# Patient Record
Sex: MALE | URBAN - METROPOLITAN AREA
[De-identification: names, ages, dates, MRNs, and addresses within clinical notes are randomized per-mention and may not be internally consistent; named-entity substitution may affect disease eponyms.]

---

## 2017-04-08 ENCOUNTER — EMERGENCY (EMERGENCY)
Facility: HOSPITAL | Age: 47
LOS: 1 days | Discharge: ANOTHER HOSPITAL | End: 2017-04-08
Admitting: EMERGENCY MEDICINE
Payer: SELF-PAY

## 2017-04-08 VITALS
HEART RATE: 71 BPM | OXYGEN SATURATION: 93 % | DIASTOLIC BLOOD PRESSURE: 93 MMHG | RESPIRATION RATE: 18 BRPM | SYSTOLIC BLOOD PRESSURE: 137 MMHG | TEMPERATURE: 99 F

## 2017-04-08 DIAGNOSIS — F23 BRIEF PSYCHOTIC DISORDER: ICD-10-CM

## 2017-04-08 LAB
ALBUMIN SERPL ELPH-MCNC: 4.2 G/DL — SIGNIFICANT CHANGE UP (ref 3.3–5)
ALP SERPL-CCNC: 79 U/L — SIGNIFICANT CHANGE UP (ref 40–120)
ALT FLD-CCNC: 15 U/L — SIGNIFICANT CHANGE UP (ref 4–41)
AMPHET UR-MCNC: POSITIVE — SIGNIFICANT CHANGE UP
APAP SERPL-MCNC: < 15 UG/ML — LOW (ref 15–25)
APPEARANCE UR: SIGNIFICANT CHANGE UP
AST SERPL-CCNC: 45 U/L — HIGH (ref 4–40)
BACTERIA # UR AUTO: SIGNIFICANT CHANGE UP
BARBITURATES MEASUREMENT: NEGATIVE — SIGNIFICANT CHANGE UP
BARBITURATES UR SCN-MCNC: NEGATIVE — SIGNIFICANT CHANGE UP
BASOPHILS # BLD AUTO: 0.02 K/UL — SIGNIFICANT CHANGE UP (ref 0–0.2)
BASOPHILS NFR BLD AUTO: 0.1 % — SIGNIFICANT CHANGE UP (ref 0–2)
BENZODIAZ SERPL-MCNC: NEGATIVE — SIGNIFICANT CHANGE UP
BENZODIAZ UR-MCNC: NEGATIVE — SIGNIFICANT CHANGE UP
BILIRUB SERPL-MCNC: 0.6 MG/DL — SIGNIFICANT CHANGE UP (ref 0.2–1.2)
BILIRUB UR-MCNC: NEGATIVE — SIGNIFICANT CHANGE UP
BLOOD UR QL VISUAL: HIGH
BUN SERPL-MCNC: 18 MG/DL — SIGNIFICANT CHANGE UP (ref 7–23)
CALCIUM SERPL-MCNC: 9.9 MG/DL — SIGNIFICANT CHANGE UP (ref 8.4–10.5)
CANNABINOIDS UR-MCNC: NEGATIVE — SIGNIFICANT CHANGE UP
CHLORIDE SERPL-SCNC: 102 MMOL/L — SIGNIFICANT CHANGE UP (ref 98–107)
CO2 SERPL-SCNC: 25 MMOL/L — SIGNIFICANT CHANGE UP (ref 22–31)
COCAINE METAB.OTHER UR-MCNC: POSITIVE — SIGNIFICANT CHANGE UP
COLOR SPEC: YELLOW — SIGNIFICANT CHANGE UP
CREAT SERPL-MCNC: 1.21 MG/DL — SIGNIFICANT CHANGE UP (ref 0.5–1.3)
EOSINOPHIL # BLD AUTO: 0 K/UL — SIGNIFICANT CHANGE UP (ref 0–0.5)
EOSINOPHIL NFR BLD AUTO: 0 % — SIGNIFICANT CHANGE UP (ref 0–6)
ETHANOL BLD-MCNC: < 10 MG/DL — SIGNIFICANT CHANGE UP
GLUCOSE SERPL-MCNC: 102 MG/DL — HIGH (ref 70–99)
GLUCOSE UR-MCNC: NEGATIVE — SIGNIFICANT CHANGE UP
HCT VFR BLD CALC: 36.2 % — LOW (ref 39–50)
HGB BLD-MCNC: 12.5 G/DL — LOW (ref 13–17)
HYALINE CASTS # UR AUTO: SIGNIFICANT CHANGE UP (ref 0–?)
IMM GRANULOCYTES NFR BLD AUTO: 0.2 % — SIGNIFICANT CHANGE UP (ref 0–1.5)
KETONES UR-MCNC: SIGNIFICANT CHANGE UP
LEUKOCYTE ESTERASE UR-ACNC: NEGATIVE — SIGNIFICANT CHANGE UP
LYMPHOCYTES # BLD AUTO: 1.69 K/UL — SIGNIFICANT CHANGE UP (ref 1–3.3)
LYMPHOCYTES # BLD AUTO: 11 % — LOW (ref 13–44)
MCHC RBC-ENTMCNC: 30.1 PG — SIGNIFICANT CHANGE UP (ref 27–34)
MCHC RBC-ENTMCNC: 34.5 % — SIGNIFICANT CHANGE UP (ref 32–36)
MCV RBC AUTO: 87.2 FL — SIGNIFICANT CHANGE UP (ref 80–100)
METHADONE UR-MCNC: NEGATIVE — SIGNIFICANT CHANGE UP
MONOCYTES # BLD AUTO: 0.71 K/UL — SIGNIFICANT CHANGE UP (ref 0–0.9)
MONOCYTES NFR BLD AUTO: 4.6 % — SIGNIFICANT CHANGE UP (ref 2–14)
MUCOUS THREADS # UR AUTO: SIGNIFICANT CHANGE UP
NEUTROPHILS # BLD AUTO: 12.92 K/UL — HIGH (ref 1.8–7.4)
NEUTROPHILS NFR BLD AUTO: 84.1 % — HIGH (ref 43–77)
NITRITE UR-MCNC: NEGATIVE — SIGNIFICANT CHANGE UP
OPIATES UR-MCNC: NEGATIVE — SIGNIFICANT CHANGE UP
OXYCODONE UR-MCNC: NEGATIVE — SIGNIFICANT CHANGE UP
PCP UR-MCNC: NEGATIVE — SIGNIFICANT CHANGE UP
PH UR: 6 — SIGNIFICANT CHANGE UP (ref 4.6–8)
PLATELET # BLD AUTO: 263 K/UL — SIGNIFICANT CHANGE UP (ref 150–400)
PMV BLD: 9.7 FL — SIGNIFICANT CHANGE UP (ref 7–13)
POTASSIUM SERPL-MCNC: 3 MMOL/L — LOW (ref 3.5–5.3)
POTASSIUM SERPL-SCNC: 3 MMOL/L — LOW (ref 3.5–5.3)
PROT SERPL-MCNC: 6.7 G/DL — SIGNIFICANT CHANGE UP (ref 6–8.3)
PROT UR-MCNC: 100 — SIGNIFICANT CHANGE UP
RBC # BLD: 4.15 M/UL — LOW (ref 4.2–5.8)
RBC # FLD: 12.8 % — SIGNIFICANT CHANGE UP (ref 10.3–14.5)
RBC CASTS # UR COMP ASSIST: HIGH (ref 0–?)
SALICYLATES SERPL-MCNC: < 5 MG/DL — LOW (ref 15–30)
SODIUM SERPL-SCNC: 144 MMOL/L — SIGNIFICANT CHANGE UP (ref 135–145)
SP GR SPEC: 1.03 — SIGNIFICANT CHANGE UP (ref 1–1.03)
TSH SERPL-MCNC: 1.33 UIU/ML — SIGNIFICANT CHANGE UP (ref 0.27–4.2)
UROBILINOGEN FLD QL: 1 E.U. — SIGNIFICANT CHANGE UP (ref 0.1–0.2)
WBC # BLD: 15.37 K/UL — HIGH (ref 3.8–10.5)
WBC # FLD AUTO: 15.37 K/UL — HIGH (ref 3.8–10.5)
WBC UR QL: SIGNIFICANT CHANGE UP (ref 0–?)

## 2017-04-08 PROCEDURE — 70450 CT HEAD/BRAIN W/O DYE: CPT | Mod: 26

## 2017-04-08 PROCEDURE — 93010 ELECTROCARDIOGRAM REPORT: CPT

## 2017-04-08 PROCEDURE — 99285 EMERGENCY DEPT VISIT HI MDM: CPT | Mod: GC

## 2017-04-08 PROCEDURE — 99285 EMERGENCY DEPT VISIT HI MDM: CPT | Mod: 25

## 2017-04-08 RX ORDER — DIPHENHYDRAMINE HCL 50 MG
50 CAPSULE ORAL ONCE
Qty: 0 | Refills: 0 | Status: COMPLETED | OUTPATIENT
Start: 2017-04-08 | End: 2017-04-08

## 2017-04-08 RX ORDER — HALOPERIDOL DECANOATE 100 MG/ML
5 INJECTION INTRAMUSCULAR ONCE
Qty: 0 | Refills: 0 | Status: COMPLETED | OUTPATIENT
Start: 2017-04-08 | End: 2017-04-08

## 2017-04-08 RX ORDER — POTASSIUM CHLORIDE 20 MEQ
40 PACKET (EA) ORAL ONCE
Qty: 0 | Refills: 0 | Status: COMPLETED | OUTPATIENT
Start: 2017-04-08 | End: 2017-04-08

## 2017-04-08 RX ADMIN — Medication 50 MILLIGRAM(S): at 21:00

## 2017-04-08 RX ADMIN — HALOPERIDOL DECANOATE 5 MILLIGRAM(S): 100 INJECTION INTRAMUSCULAR at 21:00

## 2017-04-08 RX ADMIN — Medication 2 MILLIGRAM(S): at 21:00

## 2017-04-08 NOTE — ED BEHAVIORAL HEALTH ASSESSMENT NOTE - MEDICAL ISSUES AND PLAN (INCLUDE STANDING AND PRN MEDICATION)
Will replete K w 40 mEq tab once in the am Recommend kristal replete K w 40 mEq tab once in the am on the inpatient unit no acute indication for replation per medical ED.

## 2017-04-08 NOTE — ED BEHAVIORAL HEALTH NOTE - BEHAVIORAL HEALTH NOTE
Patient's insurance information was unavailable thus we could not determine if required or obtain prior authorization.

## 2017-04-08 NOTE — ED BEHAVIORAL HEALTH ASSESSMENT NOTE - DESCRIPTION
Upon presentation pt was very agitated and uncooperative, arguing that we would steal the 7000+$ that he brought w him, pt would not speak if security was in the room, and would make writer touch the matress bcs on multiple occasions yet not wanting to say what for. 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

## 2017-04-08 NOTE — ED PROVIDER NOTE - OBJECTIVE STATEMENT
47 y/o M , no significant medical/ Psychiatric hx BIBA  w c/o  delusions ' someone has stolen y identity".  States that his name on his documents were not accurate, so he called the police. Patient appears grossly paranoid and agitated. Medicated for safety and agitation.

## 2017-04-08 NOTE — ED BEHAVIORAL HEALTH ASSESSMENT NOTE - PSYCHIATRIC ISSUES AND PLAN (INCLUDE STANDING AND PRN MEDICATION)
Will start risperidone 1 mg po qhs, agitation PRN ativan 2 mg po/im q6h, benadryl 50 mg po/im q6h, haldol 5 mg po/im q6h

## 2017-04-08 NOTE — ED BEHAVIORAL HEALTH ASSESSMENT NOTE - HPI (INCLUDE ILLNESS QUALITY, SEVERITY, DURATION, TIMING, CONTEXT, MODIFYING FACTORS, ASSOCIATED SIGNS AND SYMPTOMS)
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     Pt states that he has been noticing for the past couple weeks some circumstances that just recently made him think that 2 girls he was dating are plotting against him. He broke up with the first one a couple months ago, who which he had been going out for 7 years. He says that he then meet another girl that he was dating, and that this girl started to get suspicious that he was trying to hack her phone. He then let her use his own phone, and she used it for about a week. In the meantime, his previous girlfriend re-contacted him after some time off. He remembers that he did buy her her phone, and started suspecting that she might also be accusing him of hacking her phone as well. He was surprised that the 2 girls became friends, as he would expect that they did not like each other. He later started thinking that actually when the second one took his phone, what she was doing was to hack it. Then he noticed that there was some movement in his bank account that was inaccurate, and that he thinks that was done by them. He also noticed when he was reviewing his paperwork of the license, medical insurance and social security, that someone had been going through these papers, and that some of these papers had his name mis-spelled (Pale, instead of Pile in his last name), what made him think that the 2 girls plotted against him to steal his identity. He says that last night they had hacked his car, and that he is not using it anymore for that reason and they took it from him. He urged to go to a bank in order to withdraw any money that he had, because he is concerned that they may take it and leave him w nothing. Of note the patient has been not going to work for the last couple weeks, because he was preoccupied by this, has been isolated, and not going to work, having received a letter about his unjustified absence for 2 weeks. Pt states that he uses cannabis on a regular basis, but denies having changed the provider or the quantity, and denies using any other type of drugs. Pt denies hallucinations along w this event. Pt denies mood sx, including depression, anhedonia, euphoria or irritability. Pt denies any of these sx prior to the onset 2 weeks ago.     Collateral from girlfriend of 7 years, she says that they recently broke up because he was "stressed out", more irritable and preoccupied than usual, about her family and their relationship. She had not seen him in a while, but recently he went to visit him, and he found out that he was w this other girl. She says that he got very agitated at the moment, and that he started accusing her of stealing his identity, his phone, and his car. She says that they got into an argument about that and that she left him at home. She says that this behavior is completely unusual for him, and denies having had any evidence of psychotic sx over the time she has known him (10 years total, including 7 dating). She denies hx of SA, denies recent changes in cannabis use, and has no evidence that he might have done other types of drugs. 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     Pt states that he has been noticing for the past couple weeks some circumstances that just recently made him think that 2 girls he was dating are plotting against him. He broke up with the first one a couple months ago, who which he had been going out for 7 years. He says that he then meet another girl that he was dating, and that this girl started to get suspicious that he was trying to hack her phone. He then let her use his own phone, and she used it for about a week. In the meantime, his previous girlfriend re-contacted him after some time off. He remembers that he did buy her her phone, and started suspecting that she might also be accusing him of hacking her phone as well. He was surprised that the 2 girls became friends, as he would expect that they did not like each other. He later started thinking that actually when the second one took his phone, what she was doing was to hack it. Then he noticed that there was some movement in his bank account that was inaccurate, and that he thinks that was done by them. He also noticed when he was reviewing his paperwork of the license, medical insurance and social security, that someone had been going through these papers, and that some of these papers had his name mis-spelled (Pale, instead of Pile in his last name), what made him think that the 2 girls plotted against him to steal his identity. He says that last night they had hacked his car, and that he is not using it anymore for that reason and they took it from him. He urged to go to a bank in order to withdraw any money that he had, because he is concerned that they may take it and leave him w nothing. Of note the patient has been not going to work for the last couple weeks, because he was preoccupied by this, has been isolated, and not going to work, having received a letter about his unjustified absence for 2 weeks. Pt states that he uses cannabis on a regular basis, but denies having changed the provider or the quantity, and denies using any other type of drugs. Pt denies hallucinations along w this event. Pt denies mood sx, including depression, anhedonia, euphoria or irritability. Pt denies any of these sx prior to the onset 2 weeks ago.     Collateral from girlfriend of 7 years, she says that they recently broke up because he was "stressed out", more irritable and preoccupied than usual, about her family and their relationship. She had not seen him in a while, but recently he went to visit him, and he found out that he was with this other girl. She says that he got very agitated at the moment, and that he started accusing her of stealing his identity, his phone, and his car. She says that they got into an argument about that and that she left him at home. She says that this behavior is completely unusual for him, and denies having had any evidence of psychotic sx over the time she has known him (10 years total, including 7 dating). She denies hx of SA, denies recent changes in cannabis use, and has no evidence that he might have done other types of drugs.

## 2017-04-08 NOTE — ED ADULT TRIAGE NOTE - CHIEF COMPLAINT QUOTE
Pt states he feels like people are trying to blow up his car and that he is being electrocuted at his house.  Denies SI/HI, calm and cooperative in triage.

## 2017-04-08 NOTE — ED ADULT NURSE REASSESSMENT NOTE - NS ED NURSE REASSESS COMMENT FT1
23:25-Patient currently in BH room 3, head CT complete, pt currently awaiting further MD evaluation, will continue to monitor pt.

## 2017-04-08 NOTE — ED ADULT NURSE NOTE - OBJECTIVE STATEMENT
Pt arrived to  unit at 2015 A&Ox3, pt extremely agitated , and uncooperative, pt acting  extremely paranoid upon arrival to , Pt sedated due to agitation escalating as per MD orders. Pt breathing spontaneously and unlabored, no injuries noted on patient , after sedation patient became calm and cooperated. v/s as charted, will continue to monitor. Pt arrived to  unit at 2015 A&Ox3, pt extremely agitated , and uncooperative, pt acting  extremely paranoid upon arrival to , Pt sedated due to agitation escalating as per MD orders. Pt breathing spontaneously and unlabored, no injuries noted on patient , after sedation patient became calm and cooperated. v/s as charted, will continue to monitor.  Note: Pt arrived with 7,000 in his pocket, all belongings and money was logged and accounted for with security.  Money was secured and logged in with security office.

## 2017-04-08 NOTE — ED PROVIDER NOTE - MEDICAL DECISION MAKING DETAILS
45 y/o M , no significant medical/ Psychiatric hx, behaviour likely due to polysubstance abuse.  CT head, EKG, labs, Urine/Tox, UA. Hydration encouraged. No evidence of physical injuries, broken skin or deformities.   Psychiatric consultations and dispo as per psych. 45 y/o M , no significant medical/ Psychiatric hx, behaviour likely due to polysubstance abuse.  CT head, EKG, labs, Urine/Tox, UA. Hydration encouraged. No evidence of physical injuries, broken skin or deformities.   Psychiatric consultations and dispo as per psych.. 45 y/o M , no significant medical/ Psychiatric hx, behaviour likely due to polysubstance abuse.  CT head, EKG, labs, Urine/Tox, UA. Hydration encouraged. No evidence of physical injuries, broken skin or deformities.   Psychiatric consultations and dispo as per psych.. Recommend 40 mEq potasium via mouth

## 2017-04-08 NOTE — ED BEHAVIORAL HEALTH ASSESSMENT NOTE - SUMMARY
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 Pt presents w paranoid and referential delusions for 2 weeks in the absence of previous PH, on which he is behaving (i.e., showed up to ED w all his savings concerned that they may be stolen by whoever stole his identity). At his age, organic cause should specially be ruled out for first break, however no evident cause (chronic cannabis use unchanged). 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 Pt presents w paranoid and referential delusions for 2 weeks in the absence of previous PH, on which he is behaving (i.e., showed up to ED w all his savings concerned that they may be stolen by whoever stole his identity, and was agitated reporting that strangers were going to steal his money away). 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 Pt presents w paranoid and referential delusions for 2 weeks in the absence of previous PH, on which he is behaving (i.e., showed up to ED w all his savings concerned that they may be stolen by whoever stole his identity, and was agitated reporting that strangers were going to steal his money away). Medical workup for psychosis was conducted, resulted in clear CT with Utox + for stimulants and cocaine. Given the current presentation of 2+ weeks of delusions resulting in behavioral changes without any support in the community makes of his discharge unsafe - an imminent danger to himself, but also to others.

## 2017-04-08 NOTE — ED BEHAVIORAL HEALTH ASSESSMENT NOTE - RISK ASSESSMENT
risk factors include acute psychosis, substance use, behaving on his psychotic sx, social isolation, at risk of losing employment. Mitigated by lack of current SI/HI, no PPH, no legal hx, and hospitalization.

## 2017-04-08 NOTE — ED BEHAVIORAL HEALTH ASSESSMENT NOTE - CASE SUMMARY
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 Pt presents w paranoid and referential delusions for 2 weeks in the absence of previous PH, on which he is behaving (i.e., showed up to ED w all his savings concerned that they may be stolen by whoever stole his identity, and was agitated reporting that strangers were going to steal his money away). Medical workup for psychosis was conducted, resulted in clear CT with Utox + for stimulants and cocaine. Patient has an elaborate delusion, extremely paranoid, became agitated when challenged requiring medication over objection, he is not functioning due to the psychosis not going to work and acting on his delusions removing all of his savings from the bank, he is not caring for himself and given his agitation in response to challenging the delusions poses a danger to self and others with no insight, requires involuntary inpatient psychiatric hospitalization for safety and stabilization.  He has elaborate delusions over several weeks not consistent with substance induced psychosis though it may have been precipitated by amphetamine.cocain abuse. We have no beds at [facility] thus we will transfer to [facility] on 9.27 status and start risperidone for psychosis.  K+ 3.0, medical ED not concerned for acute problems but recommended oral repletion in the am on the unit 40 meq.

## 2017-04-09 ENCOUNTER — INPATIENT (INPATIENT)
Facility: HOSPITAL | Age: 47
LOS: 10 days | Discharge: HOME | DRG: 897 | End: 2017-04-20
Attending: PSYCHIATRY & NEUROLOGY | Admitting: PSYCHIATRY & NEUROLOGY
Payer: COMMERCIAL

## 2017-04-09 VITALS
HEART RATE: 70 BPM | RESPIRATION RATE: 14 BRPM | TEMPERATURE: 99 F | OXYGEN SATURATION: 100 % | DIASTOLIC BLOOD PRESSURE: 70 MMHG | SYSTOLIC BLOOD PRESSURE: 105 MMHG

## 2017-04-09 VITALS
HEART RATE: 101 BPM | TEMPERATURE: 98 F | SYSTOLIC BLOOD PRESSURE: 158 MMHG | HEIGHT: 69 IN | WEIGHT: 132.06 LBS | RESPIRATION RATE: 19 BRPM | DIASTOLIC BLOOD PRESSURE: 99 MMHG

## 2017-04-09 PROCEDURE — 99231 SBSQ HOSP IP/OBS SF/LOW 25: CPT

## 2017-04-09 RX ORDER — HALOPERIDOL DECANOATE 100 MG/ML
5 INJECTION INTRAMUSCULAR EVERY 6 HOURS
Qty: 0 | Refills: 0 | Status: DISCONTINUED | OUTPATIENT
Start: 2017-04-09 | End: 2017-04-20

## 2017-04-09 RX ORDER — RISPERIDONE 4 MG/1
1 TABLET ORAL AT BEDTIME
Qty: 0 | Refills: 0 | Status: DISCONTINUED | OUTPATIENT
Start: 2017-04-09 | End: 2017-04-12

## 2017-04-09 RX ADMIN — Medication 40 MILLIEQUIVALENT(S): at 02:48

## 2017-04-10 PROCEDURE — 99223 1ST HOSP IP/OBS HIGH 75: CPT

## 2017-04-10 RX ORDER — DIPHENHYDRAMINE HCL 50 MG
50 CAPSULE ORAL EVERY 6 HOURS
Qty: 0 | Refills: 0 | Status: DISCONTINUED | OUTPATIENT
Start: 2017-04-10 | End: 2017-04-20

## 2017-04-11 PROCEDURE — 99232 SBSQ HOSP IP/OBS MODERATE 35: CPT

## 2017-04-12 LAB — PCP SPEC-MCNC: SIGNIFICANT CHANGE UP

## 2017-04-12 PROCEDURE — 99232 SBSQ HOSP IP/OBS MODERATE 35: CPT

## 2017-04-12 RX ORDER — RISPERIDONE 4 MG/1
1 TABLET ORAL
Qty: 0 | Refills: 0 | Status: DISCONTINUED | OUTPATIENT
Start: 2017-04-12 | End: 2017-04-17

## 2017-04-12 RX ORDER — RISPERIDONE 4 MG/1
1 TABLET ORAL ONCE
Qty: 0 | Refills: 0 | Status: COMPLETED | OUTPATIENT
Start: 2017-04-12 | End: 2017-04-12

## 2017-04-13 PROCEDURE — 99232 SBSQ HOSP IP/OBS MODERATE 35: CPT

## 2017-04-14 PROCEDURE — 99232 SBSQ HOSP IP/OBS MODERATE 35: CPT

## 2017-04-14 RX ORDER — RISPERIDONE 4 MG/1
0.5 TABLET ORAL ONCE
Qty: 0 | Refills: 0 | Status: COMPLETED | OUTPATIENT
Start: 2017-04-14 | End: 2017-04-14

## 2017-04-14 RX ADMIN — RISPERIDONE 0.5 MILLIGRAM(S): 4 TABLET ORAL at 13:14

## 2017-04-15 RX ADMIN — RISPERIDONE 1 MILLIGRAM(S): 4 TABLET ORAL at 09:47

## 2017-04-15 RX ADMIN — RISPERIDONE 1 MILLIGRAM(S): 4 TABLET ORAL at 22:21

## 2017-04-16 RX ADMIN — RISPERIDONE 1 MILLIGRAM(S): 4 TABLET ORAL at 09:59

## 2017-04-16 RX ADMIN — RISPERIDONE 1 MILLIGRAM(S): 4 TABLET ORAL at 22:38

## 2017-04-17 PROCEDURE — 99232 SBSQ HOSP IP/OBS MODERATE 35: CPT

## 2017-04-17 RX ORDER — ACETAMINOPHEN 500 MG
650 TABLET ORAL EVERY 6 HOURS
Qty: 0 | Refills: 0 | Status: DISCONTINUED | OUTPATIENT
Start: 2017-04-17 | End: 2017-04-20

## 2017-04-17 RX ORDER — RISPERIDONE 4 MG/1
1 TABLET ORAL DAILY
Qty: 0 | Refills: 0 | Status: DISCONTINUED | OUTPATIENT
Start: 2017-04-17 | End: 2017-04-20

## 2017-04-17 RX ORDER — RISPERIDONE 4 MG/1
2 TABLET ORAL AT BEDTIME
Qty: 0 | Refills: 0 | Status: DISCONTINUED | OUTPATIENT
Start: 2017-04-17 | End: 2017-04-20

## 2017-04-17 RX ORDER — METFORMIN HYDROCHLORIDE 850 MG/1
500 TABLET ORAL
Qty: 0 | Refills: 0 | Status: DISCONTINUED | OUTPATIENT
Start: 2017-04-17 | End: 2017-04-20

## 2017-04-17 RX ADMIN — RISPERIDONE 1 MILLIGRAM(S): 4 TABLET ORAL at 10:53

## 2017-04-17 RX ADMIN — RISPERIDONE 2 MILLIGRAM(S): 4 TABLET ORAL at 22:47

## 2017-04-18 PROCEDURE — 99232 SBSQ HOSP IP/OBS MODERATE 35: CPT

## 2017-04-18 RX ADMIN — RISPERIDONE 2 MILLIGRAM(S): 4 TABLET ORAL at 22:41

## 2017-04-18 RX ADMIN — METFORMIN HYDROCHLORIDE 500 MILLIGRAM(S): 850 TABLET ORAL at 19:08

## 2017-04-18 RX ADMIN — RISPERIDONE 1 MILLIGRAM(S): 4 TABLET ORAL at 09:53

## 2017-04-19 PROCEDURE — 99232 SBSQ HOSP IP/OBS MODERATE 35: CPT

## 2017-04-19 RX ORDER — METFORMIN HYDROCHLORIDE 850 MG/1
1 TABLET ORAL
Qty: 14 | Refills: 0 | OUTPATIENT
Start: 2017-04-19 | End: 2017-05-03

## 2017-04-19 RX ORDER — RISPERIDONE 4 MG/1
1 TABLET ORAL
Qty: 14 | Refills: 0 | OUTPATIENT
Start: 2017-04-19 | End: 2017-05-03

## 2017-04-19 RX ADMIN — RISPERIDONE 1 MILLIGRAM(S): 4 TABLET ORAL at 09:58

## 2017-04-19 RX ADMIN — METFORMIN HYDROCHLORIDE 500 MILLIGRAM(S): 850 TABLET ORAL at 16:49

## 2017-04-19 RX ADMIN — RISPERIDONE 2 MILLIGRAM(S): 4 TABLET ORAL at 22:07

## 2017-04-20 VITALS
HEART RATE: 98 BPM | SYSTOLIC BLOOD PRESSURE: 116 MMHG | TEMPERATURE: 98 F | DIASTOLIC BLOOD PRESSURE: 71 MMHG | RESPIRATION RATE: 18 BRPM

## 2017-04-20 LAB
ALBUMIN SERPL ELPH-MCNC: 3.2 G/DL — LOW (ref 3.4–5)
ALP SERPL-CCNC: 74 U/L — SIGNIFICANT CHANGE UP (ref 40–120)
ALT FLD-CCNC: 29 U/L — SIGNIFICANT CHANGE UP (ref 12–42)
ANION GAP SERPL CALC-SCNC: 9 MMOL/L — SIGNIFICANT CHANGE UP (ref 9–16)
AST SERPL-CCNC: 19 U/L — SIGNIFICANT CHANGE UP (ref 15–37)
BILIRUB SERPL-MCNC: 0.5 MG/DL — SIGNIFICANT CHANGE UP (ref 0.2–1.2)
BUN SERPL-MCNC: 17 MG/DL — SIGNIFICANT CHANGE UP (ref 7–23)
CALCIUM SERPL-MCNC: 8.8 MG/DL — SIGNIFICANT CHANGE UP (ref 8.5–10.5)
CHLORIDE SERPL-SCNC: 103 MMOL/L — SIGNIFICANT CHANGE UP (ref 96–108)
CO2 SERPL-SCNC: 28 MMOL/L — SIGNIFICANT CHANGE UP (ref 22–31)
CREAT SERPL-MCNC: 0.73 MG/DL — SIGNIFICANT CHANGE UP (ref 0.5–1.3)
GLUCOSE SERPL-MCNC: 90 MG/DL — SIGNIFICANT CHANGE UP (ref 70–99)
HCT VFR BLD CALC: 36 % — LOW (ref 39–50)
HGB BLD-MCNC: 11.9 G/DL — LOW (ref 13–17)
MCHC RBC-ENTMCNC: 29.9 PG — SIGNIFICANT CHANGE UP (ref 27–34)
MCHC RBC-ENTMCNC: 33.1 G/DL — SIGNIFICANT CHANGE UP (ref 32–36)
MCV RBC AUTO: 90.5 FL — SIGNIFICANT CHANGE UP (ref 80–100)
PLATELET # BLD AUTO: 285 K/UL — SIGNIFICANT CHANGE UP (ref 150–400)
POTASSIUM SERPL-MCNC: 4.5 MMOL/L — SIGNIFICANT CHANGE UP (ref 3.5–5.3)
POTASSIUM SERPL-SCNC: 4.5 MMOL/L — SIGNIFICANT CHANGE UP (ref 3.5–5.3)
PROT SERPL-MCNC: 6.2 G/DL — LOW (ref 6.4–8.2)
RBC # BLD: 3.98 M/UL — LOW (ref 4.2–5.8)
RBC # FLD: 13.3 % — SIGNIFICANT CHANGE UP (ref 10.3–16.9)
SODIUM SERPL-SCNC: 140 MMOL/L — SIGNIFICANT CHANGE UP (ref 135–145)
WBC # BLD: 4.8 K/UL — SIGNIFICANT CHANGE UP (ref 3.8–10.5)
WBC # FLD AUTO: 4.8 K/UL — SIGNIFICANT CHANGE UP (ref 3.8–10.5)

## 2017-04-20 PROCEDURE — 36415 COLL VENOUS BLD VENIPUNCTURE: CPT

## 2017-04-20 PROCEDURE — 85027 COMPLETE CBC AUTOMATED: CPT

## 2017-04-20 PROCEDURE — 83036 HEMOGLOBIN GLYCOSYLATED A1C: CPT

## 2017-04-20 PROCEDURE — 80053 COMPREHEN METABOLIC PANEL: CPT

## 2017-04-20 PROCEDURE — 80061 LIPID PANEL: CPT

## 2017-04-20 PROCEDURE — 80307 DRUG TEST PRSMV CHEM ANLYZR: CPT

## 2017-04-20 RX ADMIN — RISPERIDONE 1 MILLIGRAM(S): 4 TABLET ORAL at 10:07

## 2017-04-24 DIAGNOSIS — F22 DELUSIONAL DISORDERS: ICD-10-CM

## 2017-04-24 DIAGNOSIS — E11.9 TYPE 2 DIABETES MELLITUS WITHOUT COMPLICATIONS: ICD-10-CM

## 2017-04-24 DIAGNOSIS — F19.14 OTHER PSYCHOACTIVE SUBSTANCE ABUSE WITH PSYCHOACTIVE SUBSTANCE-INDUCED MOOD DISORDER: ICD-10-CM

## 2018-01-23 NOTE — ED BEHAVIORAL HEALTH ASSESSMENT NOTE - SUICIDE RISK FACTORS
Have TSH today  Lantus 48 units nightly  Novolog  20 units with breakfast and lunch and 17 units with dinner  Less than 140 = 0   141-160 = 1 units   161-180 = 2 units  181-200 = 3 units  201-220 = 4 units  221-240 = 5 units  241-260 = 6 units  261-280 = 7 units  281-300 = 8 units  301-320 = 9 units  321-340 = 10 units  341-360 = 11 units  361-380 = 12 units  381-400 = 13 units    You are due for your annual dilated eye exam. This is necessary in order to maintain good eye health when you have diabetes. Please schedule this at your earliest convenience.      Work on limiting carbohydrates in the diet.    Send glucoses to Dr. Orantes in 2 weeks and follow-up in 1 month.       Agitation/severe anxiety

## 2020-12-29 NOTE — ED ADULT NURSE NOTE - CHIEF COMPLAINT QUOTE
Pt states he feels like people are trying to blow up his car and that he is being electrocuted at his house.  Denies SI/HI, calm and cooperative in triage. no concerns